# Patient Record
Sex: MALE | Race: WHITE | NOT HISPANIC OR LATINO | URBAN - METROPOLITAN AREA
[De-identification: names, ages, dates, MRNs, and addresses within clinical notes are randomized per-mention and may not be internally consistent; named-entity substitution may affect disease eponyms.]

---

## 2024-03-11 NOTE — ASU PATIENT PROFILE, ADULT - NSICDXPASTMEDICALHX_GEN_ALL_CORE_FT
PAST MEDICAL HISTORY:  No pertinent past medical history PAST MEDICAL HISTORY:  CAROLE (obstructive sleep apnea)

## 2024-03-11 NOTE — ASU PATIENT PROFILE, ADULT - FALL HARM RISK - UNIVERSAL INTERVENTIONS
Bed in lowest position, wheels locked, appropriate side rails in place/Call bell, personal items and telephone in reach/Instruct patient to call for assistance before getting out of bed or chair/Non-slip footwear when patient is out of bed/[place] to call system/Physically safe environment - no spills, clutter or unnecessary equipment/Purposeful Proactive Rounding/Room/bathroom lighting operational, light cord in reach

## 2024-03-11 NOTE — ASU PATIENT PROFILE, ADULT - PAIN SCALE PREFERRED, PROFILE
[facility] Urology Consult Patient/H&P:    Shara Hutchins is a 68 y.o. female who presents for gross hematuria.    Patient with a history of HTN, vulvar cancer s/p chemo/XRT and CKD who presented to the [facility] emergency department on 2/23/20 with abdominal pain, vaginal bleeding, DARCI  (3.8 from 2.5 baseline) and a 20 pound weight loss.     She is s/p loop ileostomy on 2/13/20 for chronic diarrhea.    CT scan on admission to [facility] revealed resolution of her pneumoatosis intestinalis and no evidence of any obvious pathology.     She was admitted to the ICU for IV fluid resuscitation. Patient has since been started on TPN for increased ostomy output and also an Octreotide drip for watery diarrhea.     She has been awaiting LTAC placement, but developed gross hematuria with clots over the past few days in the setting of an indwelling lee for urinary retention while admitted.     Patient reports intermittent gross hematuria for approximately 2 years that has never been worked up.     She denies any fever, chills, flank pain or recent urinary tract infection. She states she was voiding well prior to her present admission.     Of note, she has intermittently been followed by Dr. Aldana with Gyn/Onc for her vulvar cancer.       Urine culture  No growth  2/23/20    Creatinine  1.7   3/14/20      Past Medical History:   Diagnosis Date    Chronic kidney disease, stage III (moderate) 1/7/2018    Diabetes mellitus, type 2     Fatigue     Hypertension     Iron deficiency anemia due to chronic blood loss 1/7/2018    Vulvar cancer 1/7/2019       Past Surgical History:   Procedure Laterality Date    ABDOMINAL SURGERY      BACK SURGERY      carpel tunnel surgery once on each hand      CERVICAL FUSION      x 4    cervix repair      CHOLECYSTECTOMY  2000    DIAGNOSTIC LAPAROSCOPY N/A 2/13/2020    Procedure: LAPAROSCOPY, DIAGNOSTIC;  Surgeon: Robbi Lovell III, MD;  Location: [facility] OR;  Service: General;   Laterality: N/A;    HYSTERECTOMY  1980    ILEOSTOMY N/A 2020    Procedure: CREATION, ILEOSTOMY Loop;  Surgeon: Robbi Lovell III, MD;  Location: [facility];  Service: General;  Laterality: N/A;    LUMBAR LAMINECTOMY      LYSIS OF ADHESIONS N/A 2020    Procedure: LYSIS, ADHESIONS;  Surgeon: Robbi Lovell III, MD;  Location: [facility] OR;  Service: General;  Laterality: N/A;    NECK SURGERY      PHLEBOGRAPHY  2020    Procedure: VENOGRAM;  Surgeon: Robbi Lovell III, MD;  Location: [facility] OR;  Service: General;;    REMOVAL OF VASCULAR ACCESS PORT Right 2020    Procedure: REMOVAL, VASCULAR ACCESS PORT;  Surgeon: Robbi Lovell III, MD;  Location: [facility];  Service: General;  Laterality: Right;    SHOULDER SURGERY      vulva cancer         Family History   Problem Relation Age of Onset    Heart disease Mother     Hypertension Mother     Cancer Mother         lung    Heart disease Father     Heart disease Sister         Open heart surgery    No Known Problems Daughter        Social History     Socioeconomic History    Marital status:      Spouse name: Not on file    Number of children: Not on file    Years of education: Not on file    Highest education level: Not on file   Occupational History    Not on file   Social Needs    Financial resource strain: Not on file    Food insecurity:     Worry: Not on file     Inability: Not on file    Transportation needs:     Medical: Not on file     Non-medical: Not on file   Tobacco Use    Smoking status: Former Smoker     Packs/day: 1.00     Years: 33.00     Pack years: 33.00     Types: Cigarettes     Last attempt to quit: 2000     Years since quittin.6    Smokeless tobacco: Never Used   Substance and Sexual Activity    Alcohol use: No    Drug use: No    Sexual activity: Not on file   Lifestyle    Physical activity:     Days per week: Not on file     Minutes per session: Not on file    Stress: Not on file    Relationships    Social connections:     Talks on phone: Not on file     Gets together: Not on file     Attends Judaism service: Not on file     Active member of club or organization: Not on file     Attends meetings of clubs or organizations: Not on file     Relationship status: Not on file   Other Topics Concern    Not on file   Social History Narrative    Not on file       Review of patient's allergies indicates:   Allergen Reactions    Ciprofloxacin Other (See Comments)     Elevated liver enzymes      Pcn [penicillins] Anaphylaxis       Medications Reviewed: see MAR    ROS:    Constitutional: denies fevers, chills, night sweats, fatigue, malaise  Respiratory: negative for cough, shortness of breath, wheezing, dyspnea.  Cardiovascular: + for high blood pressure, negative for chest pain, varicose veins, ankle swelling, palpitations, syncope.  GI: + diarrhea  Urology: as noted above in HPI  Endocrinology: negative for cold intolerance, excessive thirst, not feeling tired/sluggish, no heat intolerance.   Hematology/Lymph: negative for easy bleeding, easy bruising, swollen glands.  Musculoskeletal: negative for back pain, joint pain, joint swelling, neck pain.  Allergy-Immunology: negative for seasonal allergies, negative for unusual infections.   Skin: negative for boils, breast lumps, hives, itching, rash.   Neurology: + dizziness  Psych: satisfied with life; negative for, anxiety, depression, suicidal thoughts.     PHYSICAL EXAM:    Vitals:    03/14/20 1147   BP: (!) 163/71   Pulse: 92   Resp: 20   Temp: 98.2 °F (36.8 °C)     Body mass index is 21.6 kg/m².       General: Alert, cooperative, no distress, appears stated age  Head: Normocephalic, without obvious abnormality, atraumatic  Neck: no masses, no thyromegaly, no lymphadenopathy  Eyes: PERRL, conjunctiva/corneas clear  Lungs: Respirations unlabored, normal effort, no accessory muscle use  CV: Warm and well perfused extremities  Abdomen: Soft,  non-tender, no CVA tenderness, no hepatosplenomegaly, ileostomy  : Godwin in place with pink urine, draining well  Extremities: Extremities normal, atraumatic, no cyanosis or edema  Psych: Appropriate, well oriented, normal affect, normal mood  Neuro: Non-focal      LABS:    Recent Results (from the past 336 hour(s))   CBC with Automated Differential    Collection Time: 03/01/20  5:22 AM   Result Value Ref Range    WBC 4.63 3.90 - 12.70 K/uL    RBC 2.91 (L) 4.00 - 5.40 M/uL    Hemoglobin 9.1 (L) 12.0 - 16.0 g/dL    Hematocrit 29.0 (L) 37.0 - 48.5 %    Mean Corpuscular Volume 100 (H) 82 - 98 fL    Mean Corpuscular Hemoglobin 31.3 (H) 27.0 - 31.0 pg    Mean Corpuscular Hemoglobin Conc 31.4 (L) 32.0 - 36.0 g/dL    RDW 17.7 (H) 11.5 - 14.5 %    Platelets 116 (L) 150 - 350 K/uL    MPV 11.4 9.2 - 12.9 fL    Immature Granulocytes 0.2 0.0 - 0.5 %    Gran # (ANC) 3.2 1.8 - 7.7 K/uL    Immature Grans (Abs) 0.01 0.00 - 0.04 K/uL    Lymph # 0.7 (L) 1.0 - 4.8 K/uL    Mono # 0.3 0.3 - 1.0 K/uL    Eos # 0.4 0.0 - 0.5 K/uL    Baso # 0.02 0.00 - 0.20 K/uL    nRBC 0 0 /100 WBC    Gran% 69.8 38.0 - 73.0 %    Lymph% 14.9 (L) 18.0 - 48.0 %    Mono% 6.9 4.0 - 15.0 %    Eosinophil% 7.8 0.0 - 8.0 %    Basophil% 0.4 0.0 - 1.9 %    Differential Method Automated    Basic Metabolic Panel (BMP)    Collection Time: 03/01/20  5:22 AM   Result Value Ref Range    Sodium 136 136 - 145 mmol/L    Potassium 3.7 3.5 - 5.1 mmol/L    Chloride 98 95 - 110 mmol/L    CO2 33 (H) 23 - 29 mmol/L    Glucose 100 70 - 110 mg/dL    BUN, Bld 45 (H) 8 - 23 mg/dL    Creatinine 4.0 (H) 0.5 - 1.4 mg/dL    Calcium 8.2 (L) 8.7 - 10.5 mg/dL    Anion Gap 5 (L) 8 - 16 mmol/L    eGFR if African American 12.4 (A) >60 mL/min/1.73 m^2    eGFR if non African American 10.8 (A) >60 mL/min/1.73 m^2   Magnesium    Collection Time: 03/01/20  5:22 AM   Result Value Ref Range    Magnesium 1.7 1.6 - 2.6 mg/dL   Vancomycin, random    Collection Time: 03/01/20  5:22 AM   Result Value Ref  Range    Vancomycin, Random 15.9 Not established ug/mL   CBC with Automated Differential    Collection Time: 03/02/20  5:14 AM   Result Value Ref Range    WBC 3.80 (L) 3.90 - 12.70 K/uL    RBC 2.67 (L) 4.00 - 5.40 M/uL    Hemoglobin 8.4 (L) 12.0 - 16.0 g/dL    Hematocrit 26.9 (L) 37.0 - 48.5 %    Mean Corpuscular Volume 101 (H) 82 - 98 fL    Mean Corpuscular Hemoglobin 31.5 (H) 27.0 - 31.0 pg    Mean Corpuscular Hemoglobin Conc 31.2 (L) 32.0 - 36.0 g/dL    RDW 16.7 (H) 11.5 - 14.5 %    Platelets 124 (L) 150 - 350 K/uL    MPV 12.1 9.2 - 12.9 fL    Immature Granulocytes 0.5 0.0 - 0.5 %    Gran # (ANC) 2.5 1.8 - 7.7 K/uL    Immature Grans (Abs) 0.02 0.00 - 0.04 K/uL    Lymph # 0.7 (L) 1.0 - 4.8 K/uL    Mono # 0.3 0.3 - 1.0 K/uL    Eos # 0.3 0.0 - 0.5 K/uL    Baso # 0.02 0.00 - 0.20 K/uL    nRBC 0 0 /100 WBC    Gran% 64.5 38.0 - 73.0 %    Lymph% 17.4 (L) 18.0 - 48.0 %    Mono% 8.9 4.0 - 15.0 %    Eosinophil% 8.2 (H) 0.0 - 8.0 %    Basophil% 0.5 0.0 - 1.9 %    Differential Method Automated    Basic Metabolic Panel (BMP)    Collection Time: 03/02/20  5:14 AM   Result Value Ref Range    Sodium 139 136 - 145 mmol/L    Potassium 4.5 3.5 - 5.1 mmol/L    Chloride 106 95 - 110 mmol/L    CO2 29 23 - 29 mmol/L    Glucose 97 70 - 110 mg/dL    BUN, Bld 39 (H) 8 - 23 mg/dL    Creatinine 3.1 (H) 0.5 - 1.4 mg/dL    Calcium 8.1 (L) 8.7 - 10.5 mg/dL    Anion Gap 4 (L) 8 - 16 mmol/L    eGFR if African American 16.9 (A) >60 mL/min/1.73 m^2    eGFR if non  14.7 (A) >60 mL/min/1.73 m^2   Magnesium    Collection Time: 03/02/20  5:14 AM   Result Value Ref Range    Magnesium 1.8 1.6 - 2.6 mg/dL   Vancomycin, random    Collection Time: 03/02/20  5:14 AM   Result Value Ref Range    Vancomycin, Random 13.6 Not established ug/mL   Comprehensive metabolic panel    Collection Time: 03/02/20  5:14 AM   Result Value Ref Range    Sodium 139 136 - 145 mmol/L    Potassium 4.5 3.5 - 5.1 mmol/L    Chloride 106 95 - 110 mmol/L    CO2 29 23 -  29 mmol/L    Glucose 97 70 - 110 mg/dL    BUN, Bld 39 (H) 8 - 23 mg/dL    Creatinine 3.1 (H) 0.5 - 1.4 mg/dL    Calcium 8.1 (L) 8.7 - 10.5 mg/dL    Total Protein 4.5 (L) 6.0 - 8.4 g/dL    Albumin 2.5 (L) 3.5 - 5.2 g/dL    Total Bilirubin 1.5 (H) 0.1 - 1.0 mg/dL    Alkaline Phosphatase 303 (H) 55 - 135 U/L    AST 31 10 - 40 U/L    ALT 22 10 - 44 U/L    Anion Gap 4 (L) 8 - 16 mmol/L    eGFR if African American 16.9 (A) >60 mL/min/1.73 m^2    eGFR if non  14.7 (A) >60 mL/min/1.73 m^2   Phosphorus    Collection Time: 03/02/20  5:14 AM   Result Value Ref Range    Phosphorus 2.6 (L) 2.7 - 4.5 mg/dL   Prealbumin    Collection Time: 03/02/20  5:14 AM   Result Value Ref Range    Prealbumin 15 (L) 20.0 - 43.0 mg/dL   Triglycerides    Collection Time: 03/02/20  5:14 AM   Result Value Ref Range    Triglycerides 73 30 - 150 mg/dL   CBC with Automated Differential    Collection Time: 03/03/20  4:40 AM   Result Value Ref Range    WBC 4.30 3.90 - 12.70 K/uL    RBC 2.68 (L) 4.00 - 5.40 M/uL    Hemoglobin 8.5 (L) 12.0 - 16.0 g/dL    Hematocrit 27.6 (L) 37.0 - 48.5 %    Mean Corpuscular Volume 103 (H) 82 - 98 fL    Mean Corpuscular Hemoglobin 31.7 (H) 27.0 - 31.0 pg    Mean Corpuscular Hemoglobin Conc 30.8 (L) 32.0 - 36.0 g/dL    RDW 16.5 (H) 11.5 - 14.5 %    Platelets 122 (L) 150 - 350 K/uL    MPV 11.9 9.2 - 12.9 fL    Immature Granulocytes 0.5 0.0 - 0.5 %    Gran # (ANC) 2.6 1.8 - 7.7 K/uL    Immature Grans (Abs) 0.02 0.00 - 0.04 K/uL    Lymph # 1.0 1.0 - 4.8 K/uL    Mono # 0.4 0.3 - 1.0 K/uL    Eos # 0.3 0.0 - 0.5 K/uL    Baso # 0.01 0.00 - 0.20 K/uL    nRBC 0 0 /100 WBC    Gran% 60.7 38.0 - 73.0 %    Lymph% 22.1 18.0 - 48.0 %    Mono% 9.1 4.0 - 15.0 %    Eosinophil% 7.4 0.0 - 8.0 %    Basophil% 0.2 0.0 - 1.9 %    Differential Method Automated    Basic Metabolic Panel (BMP)    Collection Time: 03/03/20  4:40 AM   Result Value Ref Range    Sodium 141 136 - 145 mmol/L    Potassium 4.4 3.5 - 5.1 mmol/L    Chloride 112  (H) 95 - 110 mmol/L    CO2 26 23 - 29 mmol/L    Glucose 107 70 - 110 mg/dL    BUN, Bld 35 (H) 8 - 23 mg/dL    Creatinine 2.7 (H) 0.5 - 1.4 mg/dL    Calcium 8.3 (L) 8.7 - 10.5 mg/dL    Anion Gap 3 (L) 8 - 16 mmol/L    eGFR if African American 20.0 (A) >60 mL/min/1.73 m^2    eGFR if non  17.3 (A) >60 mL/min/1.73 m^2   Magnesium    Collection Time: 03/03/20  4:40 AM   Result Value Ref Range    Magnesium 2.2 1.6 - 2.6 mg/dL   Comprehensive metabolic panel    Collection Time: 03/03/20  4:40 AM   Result Value Ref Range    Sodium 141 136 - 145 mmol/L    Potassium 4.4 3.5 - 5.1 mmol/L    Chloride 112 (H) 95 - 110 mmol/L    CO2 26 23 - 29 mmol/L    Glucose 107 70 - 110 mg/dL    BUN, Bld 35 (H) 8 - 23 mg/dL    Creatinine 2.7 (H) 0.5 - 1.4 mg/dL    Calcium 8.3 (L) 8.7 - 10.5 mg/dL    Total Protein 4.6 (L) 6.0 - 8.4 g/dL    Albumin 2.6 (L) 3.5 - 5.2 g/dL    Total Bilirubin 1.5 (H) 0.1 - 1.0 mg/dL    Alkaline Phosphatase 241 (H) 55 - 135 U/L    AST 23 10 - 40 U/L    ALT 16 10 - 44 U/L    Anion Gap 3 (L) 8 - 16 mmol/L    eGFR if African American 20.0 (A) >60 mL/min/1.73 m^2    eGFR if non  17.3 (A) >60 mL/min/1.73 m^2   Phosphorus    Collection Time: 03/03/20  4:40 AM   Result Value Ref Range    Phosphorus 2.5 (L) 2.7 - 4.5 mg/dL   CBC with Automated Differential    Collection Time: 03/04/20  4:57 AM   Result Value Ref Range    WBC 4.46 3.90 - 12.70 K/uL    RBC 2.50 (L) 4.00 - 5.40 M/uL    Hemoglobin 7.9 (L) 12.0 - 16.0 g/dL    Hematocrit 25.6 (L) 37.0 - 48.5 %    Mean Corpuscular Volume 102 (H) 82 - 98 fL    Mean Corpuscular Hemoglobin 31.6 (H) 27.0 - 31.0 pg    Mean Corpuscular Hemoglobin Conc 30.9 (L) 32.0 - 36.0 g/dL    RDW 16.3 (H) 11.5 - 14.5 %    Platelets 95 (L) 150 - 350 K/uL    MPV 12.5 9.2 - 12.9 fL    Immature Granulocytes 0.2 0.0 - 0.5 %    Gran # (ANC) 3.0 1.8 - 7.7 K/uL    Immature Grans (Abs) 0.01 0.00 - 0.04 K/uL    Lymph # 0.8 (L) 1.0 - 4.8 K/uL    Mono # 0.4 0.3 - 1.0 K/uL    Eos  # 0.3 0.0 - 0.5 K/uL    Baso # 0.01 0.00 - 0.20 K/uL    nRBC 0 0 /100 WBC    Gran% 66.4 38.0 - 73.0 %    Lymph% 17.7 (L) 18.0 - 48.0 %    Mono% 9.0 4.0 - 15.0 %    Eosinophil% 6.5 0.0 - 8.0 %    Basophil% 0.2 0.0 - 1.9 %    Differential Method Automated    Basic Metabolic Panel (BMP)    Collection Time: 03/04/20  4:57 AM   Result Value Ref Range    Sodium 141 136 - 145 mmol/L    Potassium 3.8 3.5 - 5.1 mmol/L    Chloride 107 95 - 110 mmol/L    CO2 28 23 - 29 mmol/L    Glucose 102 70 - 110 mg/dL    BUN, Bld 32 (H) 8 - 23 mg/dL    Creatinine 2.4 (H) 0.5 - 1.4 mg/dL    Calcium 8.1 (L) 8.7 - 10.5 mg/dL    Anion Gap 6 (L) 8 - 16 mmol/L    eGFR if African American 23.0 (A) >60 mL/min/1.73 m^2    eGFR if non African American 20.0 (A) >60 mL/min/1.73 m^2   Magnesium    Collection Time: 03/04/20  4:57 AM   Result Value Ref Range    Magnesium 2.2 1.6 - 2.6 mg/dL   Comprehensive metabolic panel    Collection Time: 03/04/20  4:57 AM   Result Value Ref Range    Sodium 141 136 - 145 mmol/L    Potassium 3.8 3.5 - 5.1 mmol/L    Chloride 107 95 - 110 mmol/L    CO2 28 23 - 29 mmol/L    Glucose 102 70 - 110 mg/dL    BUN, Bld 32 (H) 8 - 23 mg/dL    Creatinine 2.4 (H) 0.5 - 1.4 mg/dL    Calcium 8.1 (L) 8.7 - 10.5 mg/dL    Total Protein 4.4 (L) 6.0 - 8.4 g/dL    Albumin 2.4 (L) 3.5 - 5.2 g/dL    Total Bilirubin 1.2 (H) 0.1 - 1.0 mg/dL    Alkaline Phosphatase 228 (H) 55 - 135 U/L    AST 21 10 - 40 U/L    ALT 15 10 - 44 U/L    Anion Gap 6 (L) 8 - 16 mmol/L    eGFR if African American 23.0 (A) >60 mL/min/1.73 m^2    eGFR if non African American 20.0 (A) >60 mL/min/1.73 m^2   Phosphorus    Collection Time: 03/04/20  4:57 AM   Result Value Ref Range    Phosphorus 2.3 (L) 2.7 - 4.5 mg/dL   Vancomycin, random    Collection Time: 03/04/20  4:57 AM   Result Value Ref Range    Vancomycin, Random 14.7 Not established ug/mL   Iron and TIBC    Collection Time: 03/04/20  4:57 AM   Result Value Ref Range    Iron 34 30 - 160 ug/dL    Transferrin 107 (L)  200 - 375 mg/dL    TIBC 150 (L) 250 - 450 ug/dL    Saturated Iron 23 20 - 50 %   Ferritin    Collection Time: 03/04/20  4:57 AM   Result Value Ref Range    Ferritin 407 (H) 20.0 - 300.0 ng/mL   CBC with Automated Differential    Collection Time: 03/05/20  4:54 AM   Result Value Ref Range    WBC 3.60 (L) 3.90 - 12.70 K/uL    RBC 2.40 (L) 4.00 - 5.40 M/uL    Hemoglobin 7.5 (L) 12.0 - 16.0 g/dL    Hematocrit 24.5 (L) 37.0 - 48.5 %    Mean Corpuscular Volume 102 (H) 82 - 98 fL    Mean Corpuscular Hemoglobin 31.3 (H) 27.0 - 31.0 pg    Mean Corpuscular Hemoglobin Conc 30.6 (L) 32.0 - 36.0 g/dL    RDW 16.0 (H) 11.5 - 14.5 %    Platelets 87 (L) 150 - 350 K/uL    MPV 12.5 9.2 - 12.9 fL    Immature Granulocytes 0.6 (H) 0.0 - 0.5 %    Gran # (ANC) 2.2 1.8 - 7.7 K/uL    Immature Grans (Abs) 0.02 0.00 - 0.04 K/uL    Lymph # 0.8 (L) 1.0 - 4.8 K/uL    Mono # 0.4 0.3 - 1.0 K/uL    Eos # 0.2 0.0 - 0.5 K/uL    Baso # 0.01 0.00 - 0.20 K/uL    nRBC 0 0 /100 WBC    Gran% 59.9 38.0 - 73.0 %    Lymph% 20.8 18.0 - 48.0 %    Mono% 11.7 4.0 - 15.0 %    Eosinophil% 6.7 0.0 - 8.0 %    Basophil% 0.3 0.0 - 1.9 %    Differential Method Automated    Basic Metabolic Panel (BMP)    Collection Time: 03/05/20  4:54 AM   Result Value Ref Range    Sodium 141 136 - 145 mmol/L    Potassium 3.9 3.5 - 5.1 mmol/L    Chloride 108 95 - 110 mmol/L    CO2 31 (H) 23 - 29 mmol/L    Glucose 106 70 - 110 mg/dL    BUN, Bld 30 (H) 8 - 23 mg/dL    Creatinine 2.3 (H) 0.5 - 1.4 mg/dL    Calcium 7.9 (L) 8.7 - 10.5 mg/dL    Anion Gap 2 (L) 8 - 16 mmol/L    eGFR if African American 24.3 (A) >60 mL/min/1.73 m^2    eGFR if non  21.1 (A) >60 mL/min/1.73 m^2   Magnesium    Collection Time: 03/05/20  4:54 AM   Result Value Ref Range    Magnesium 2.2 1.6 - 2.6 mg/dL   Comprehensive metabolic panel    Collection Time: 03/05/20  4:54 AM   Result Value Ref Range    Sodium 141 136 - 145 mmol/L    Potassium 3.9 3.5 - 5.1 mmol/L    Chloride 108 95 - 110 mmol/L    CO2 31  (H) 23 - 29 mmol/L    Glucose 106 70 - 110 mg/dL    BUN, Bld 30 (H) 8 - 23 mg/dL    Creatinine 2.3 (H) 0.5 - 1.4 mg/dL    Calcium 7.9 (L) 8.7 - 10.5 mg/dL    Total Protein 4.3 (L) 6.0 - 8.4 g/dL    Albumin 2.4 (L) 3.5 - 5.2 g/dL    Total Bilirubin 1.3 (H) 0.1 - 1.0 mg/dL    Alkaline Phosphatase 217 (H) 55 - 135 U/L    AST 22 10 - 40 U/L    ALT 15 10 - 44 U/L    Anion Gap 2 (L) 8 - 16 mmol/L    eGFR if African American 24.3 (A) >60 mL/min/1.73 m^2    eGFR if non  21.1 (A) >60 mL/min/1.73 m^2   Phosphorus    Collection Time: 03/05/20  4:54 AM   Result Value Ref Range    Phosphorus 2.2 (L) 2.7 - 4.5 mg/dL   CBC with Automated Differential    Collection Time: 03/06/20  4:32 AM   Result Value Ref Range    WBC 3.61 (L) 3.90 - 12.70 K/uL    RBC 2.40 (L) 4.00 - 5.40 M/uL    Hemoglobin 7.4 (L) 12.0 - 16.0 g/dL    Hematocrit 24.7 (L) 37.0 - 48.5 %    Mean Corpuscular Volume 103 (H) 82 - 98 fL    Mean Corpuscular Hemoglobin 30.8 27.0 - 31.0 pg    Mean Corpuscular Hemoglobin Conc 30.0 (L) 32.0 - 36.0 g/dL    RDW 16.0 (H) 11.5 - 14.5 %    Platelets 85 (L) 150 - 350 K/uL    MPV 12.8 9.2 - 12.9 fL    Immature Granulocytes 0.6 (H) 0.0 - 0.5 %    Gran # (ANC) 2.1 1.8 - 7.7 K/uL    Immature Grans (Abs) 0.02 0.00 - 0.04 K/uL    Lymph # 0.9 (L) 1.0 - 4.8 K/uL    Mono # 0.4 0.3 - 1.0 K/uL    Eos # 0.2 0.0 - 0.5 K/uL    Baso # 0.02 0.00 - 0.20 K/uL    nRBC 0 0 /100 WBC    Gran% 57.3 38.0 - 73.0 %    Lymph% 23.5 18.0 - 48.0 %    Mono% 12.2 4.0 - 15.0 %    Eosinophil% 5.8 0.0 - 8.0 %    Basophil% 0.6 0.0 - 1.9 %    Differential Method Automated    Magnesium    Collection Time: 03/06/20  4:32 AM   Result Value Ref Range    Magnesium 2.3 1.6 - 2.6 mg/dL   Comprehensive metabolic panel    Collection Time: 03/06/20  4:32 AM   Result Value Ref Range    Sodium 143 136 - 145 mmol/L    Potassium 3.5 3.5 - 5.1 mmol/L    Chloride 107 95 - 110 mmol/L    CO2 30 (H) 23 - 29 mmol/L    Glucose 125 (H) 70 - 110 mg/dL    BUN, Bld 28 (H) 8 -  23 mg/dL    Creatinine 2.1 (H) 0.5 - 1.4 mg/dL    Calcium 7.7 (L) 8.7 - 10.5 mg/dL    Total Protein 4.2 (L) 6.0 - 8.4 g/dL    Albumin 2.1 (L) 3.5 - 5.2 g/dL    Total Bilirubin 0.7 0.1 - 1.0 mg/dL    Alkaline Phosphatase 223 (H) 55 - 135 U/L    AST 21 10 - 40 U/L    ALT 14 10 - 44 U/L    Anion Gap 6 (L) 8 - 16 mmol/L    eGFR if African American 27 (A) >60 mL/min/1.73 m^2    eGFR if non African American 23 (A) >60 mL/min/1.73 m^2   Phosphorus    Collection Time: 03/06/20  4:32 AM   Result Value Ref Range    Phosphorus 2.4 (L) 2.7 - 4.5 mg/dL   CBC with Automated Differential    Collection Time: 03/07/20  4:37 AM   Result Value Ref Range    WBC 4.25 3.90 - 12.70 K/uL    RBC 2.49 (L) 4.00 - 5.40 M/uL    Hemoglobin 7.8 (L) 12.0 - 16.0 g/dL    Hematocrit 25.4 (L) 37.0 - 48.5 %    Mean Corpuscular Volume 102 (H) 82 - 98 fL    Mean Corpuscular Hemoglobin 31.3 (H) 27.0 - 31.0 pg    Mean Corpuscular Hemoglobin Conc 30.7 (L) 32.0 - 36.0 g/dL    RDW 16.1 (H) 11.5 - 14.5 %    Platelets 100 (L) 150 - 350 K/uL    MPV 12.7 9.2 - 12.9 fL    Immature Granulocytes 0.5 0.0 - 0.5 %    Gran # (ANC) 2.1 1.8 - 7.7 K/uL    Immature Grans (Abs) 0.02 0.00 - 0.04 K/uL    Lymph # 1.4 1.0 - 4.8 K/uL    Mono # 0.4 0.3 - 1.0 K/uL    Eos # 0.3 0.0 - 0.5 K/uL    Baso # 0.02 0.00 - 0.20 K/uL    nRBC 0 0 /100 WBC    Gran% 49.4 38.0 - 73.0 %    Lymph% 32.9 18.0 - 48.0 %    Mono% 10.1 4.0 - 15.0 %    Eosinophil% 6.6 0.0 - 8.0 %    Basophil% 0.5 0.0 - 1.9 %    Differential Method Automated    Magnesium    Collection Time: 03/07/20  4:37 AM   Result Value Ref Range    Magnesium 2.1 1.6 - 2.6 mg/dL   Comprehensive metabolic panel    Collection Time: 03/07/20  4:37 AM   Result Value Ref Range    Sodium 144 136 - 145 mmol/L    Potassium 4.0 3.5 - 5.1 mmol/L    Chloride 109 95 - 110 mmol/L    CO2 27 23 - 29 mmol/L    Glucose 68 (L) 70 - 110 mg/dL    BUN, Bld 27 (H) 8 - 23 mg/dL    Creatinine 1.8 (H) 0.5 - 1.4 mg/dL    Calcium 7.9 (L) 8.7 - 10.5 mg/dL    Total  Protein 4.3 (L) 6.0 - 8.4 g/dL    Albumin 2.2 (L) 3.5 - 5.2 g/dL    Total Bilirubin 0.8 0.1 - 1.0 mg/dL    Alkaline Phosphatase 220 (H) 55 - 135 U/L    AST 20 10 - 40 U/L    ALT 16 10 - 44 U/L    Anion Gap 8 8 - 16 mmol/L    eGFR if African American 33 (A) >60 mL/min/1.73 m^2    eGFR if non African American 28 (A) >60 mL/min/1.73 m^2   Phosphorus    Collection Time: 03/07/20  4:37 AM   Result Value Ref Range    Phosphorus 2.6 (L) 2.7 - 4.5 mg/dL   CBC with Automated Differential    Collection Time: 03/08/20  6:21 AM   Result Value Ref Range    WBC 3.73 (L) 3.90 - 12.70 K/uL    RBC 2.31 (L) 4.00 - 5.40 M/uL    Hemoglobin 7.2 (L) 12.0 - 16.0 g/dL    Hematocrit 23.5 (L) 37.0 - 48.5 %    Mean Corpuscular Volume 102 (H) 82 - 98 fL    Mean Corpuscular Hemoglobin 31.2 (H) 27.0 - 31.0 pg    Mean Corpuscular Hemoglobin Conc 30.6 (L) 32.0 - 36.0 g/dL    RDW 16.0 (H) 11.5 - 14.5 %    Platelets 99 (L) 150 - 350 K/uL    MPV 12.5 9.2 - 12.9 fL    Immature Granulocytes 0.3 0.0 - 0.5 %    Gran # (ANC) 2.1 1.8 - 7.7 K/uL    Immature Grans (Abs) 0.01 0.00 - 0.04 K/uL    Lymph # 1.0 1.0 - 4.8 K/uL    Mono # 0.4 0.3 - 1.0 K/uL    Eos # 0.3 0.0 - 0.5 K/uL    Baso # 0.01 0.00 - 0.20 K/uL    nRBC 0 0 /100 WBC    Gran% 55.2 38.0 - 73.0 %    Lymph% 25.7 18.0 - 48.0 %    Mono% 11.0 4.0 - 15.0 %    Eosinophil% 7.5 0.0 - 8.0 %    Basophil% 0.3 0.0 - 1.9 %    Differential Method Automated    Magnesium    Collection Time: 03/08/20  6:21 AM   Result Value Ref Range    Magnesium 2.0 1.6 - 2.6 mg/dL   Comprehensive metabolic panel    Collection Time: 03/08/20  6:21 AM   Result Value Ref Range    Sodium 140 136 - 145 mmol/L    Potassium 3.3 (L) 3.5 - 5.1 mmol/L    Chloride 107 95 - 110 mmol/L    CO2 29 23 - 29 mmol/L    Glucose 153 (H) 70 - 110 mg/dL    BUN, Bld 25 (H) 8 - 23 mg/dL    Creatinine 1.9 (H) 0.5 - 1.4 mg/dL    Calcium 7.8 (L) 8.7 - 10.5 mg/dL    Total Protein 4.3 (L) 6.0 - 8.4 g/dL    Albumin 2.2 (L) 3.5 - 5.2 g/dL    Total Bilirubin 1.1  (H) 0.1 - 1.0 mg/dL    Alkaline Phosphatase 189 (H) 55 - 135 U/L    AST 20 10 - 40 U/L    ALT 14 10 - 44 U/L    Anion Gap 4 (L) 8 - 16 mmol/L    eGFR if African American 30.6 (A) >60 mL/min/1.73 m^2    eGFR if non  26.5 (A) >60 mL/min/1.73 m^2   Phosphorus    Collection Time: 03/08/20  6:21 AM   Result Value Ref Range    Phosphorus 3.0 2.7 - 4.5 mg/dL   Prepare RBC 1 Unit    Collection Time: 03/08/20 11:43 AM   Result Value Ref Range    UNIT NUMBER [id number]     Product Code [id number]     DISPENSE STATUS TRANSFUSED     CODING SYSTEM [id number]     Unit Blood Type Code 6200     Unit Blood Type A POS     Unit Expiration [id number]     UNIT NUMBER [id number]     Product Code [id number]     DISPENSE STATUS RETURNED     CODING SYSTEM [id number]     Unit Blood Type Code 6200     Unit Blood Type A POS     Unit Expiration [id number]    Type & Screen    Collection Time: 03/08/20 11:49 AM   Result Value Ref Range    Group & Rh A POS     Indirect Bruno NEG    POCT glucose    Collection Time: 03/08/20  7:02 PM   Result Value Ref Range    POC Glucose 122 (H) 70 - 110   CBC with Automated Differential    Collection Time: 03/09/20  2:43 AM   Result Value Ref Range    WBC 3.84 (L) 3.90 - 12.70 K/uL    RBC 2.66 (L) 4.00 - 5.40 M/uL    Hemoglobin 8.1 (L) 12.0 - 16.0 g/dL    Hematocrit 25.9 (L) 37.0 - 48.5 %    Mean Corpuscular Volume 97 82 - 98 fL    Mean Corpuscular Hemoglobin 30.5 27.0 - 31.0 pg    Mean Corpuscular Hemoglobin Conc 31.3 (L) 32.0 - 36.0 g/dL    RDW 18.8 (H) 11.5 - 14.5 %    Platelets 101 (L) 150 - 350 K/uL    MPV 12.3 9.2 - 12.9 fL    Immature Granulocytes 0.8 (H) 0.0 - 0.5 %    Gran # (ANC) 2.3 1.8 - 7.7 K/uL    Immature Grans (Abs) 0.03 0.00 - 0.04 K/uL    Lymph # 0.9 (L) 1.0 - 4.8 K/uL    Mono # 0.4 0.3 - 1.0 K/uL    Eos # 0.2 0.0 - 0.5 K/uL    Baso # 0.01 0.00 - 0.20 K/uL    nRBC 0 0 /100 WBC    Gran% 58.7 38.0 - 73.0 %    Lymph% 23.7 18.0 - 48.0 %    Mono% 10.2 4.0 - 15.0 %    Eosinophil% 6.3  0.0 - 8.0 %    Basophil% 0.3 0.0 - 1.9 %    Differential Method Automated    Magnesium    Collection Time: 03/09/20  2:43 AM   Result Value Ref Range    Magnesium 2.1 1.6 - 2.6 mg/dL   Comprehensive metabolic panel    Collection Time: 03/09/20  2:43 AM   Result Value Ref Range    Sodium 140 136 - 145 mmol/L    Potassium 3.3 (L) 3.5 - 5.1 mmol/L    Chloride 109 95 - 110 mmol/L    CO2 27 23 - 29 mmol/L    Glucose 127 (H) 70 - 110 mg/dL    BUN, Bld 27 (H) 8 - 23 mg/dL    Creatinine 1.7 (H) 0.5 - 1.4 mg/dL    Calcium 7.5 (L) 8.7 - 10.5 mg/dL    Total Protein 4.1 (L) 6.0 - 8.4 g/dL    Albumin 2.1 (L) 3.5 - 5.2 g/dL    Total Bilirubin 1.4 (H) 0.1 - 1.0 mg/dL    Alkaline Phosphatase 173 (H) 55 - 135 U/L    AST 18 10 - 40 U/L    ALT 13 10 - 44 U/L    Anion Gap 4 (L) 8 - 16 mmol/L    eGFR if African American 35.0 (A) >60 mL/min/1.73 m^2    eGFR if non  30.3 (A) >60 mL/min/1.73 m^2   Phosphorus    Collection Time: 03/09/20  2:43 AM   Result Value Ref Range    Phosphorus 2.6 (L) 2.7 - 4.5 mg/dL   Prealbumin    Collection Time: 03/09/20  2:43 AM   Result Value Ref Range    Prealbumin 12 (L) 20.0 - 43.0 mg/dL   Triglycerides    Collection Time: 03/09/20  2:43 AM   Result Value Ref Range    Triglycerides 80 30 - 150 mg/dL   POCT glucose    Collection Time: 03/09/20  7:54 AM   Result Value Ref Range    POC Glucose 106 70 - 110   POCT glucose    Collection Time: 03/09/20 11:52 AM   Result Value Ref Range    POC Glucose 119 (H) 70 - 110   Potassium    Collection Time: 03/09/20 12:57 PM   Result Value Ref Range    Potassium 3.5 3.5 - 5.1 mmol/L   POCT glucose    Collection Time: 03/09/20  4:08 PM   Result Value Ref Range    POC Glucose 119 (H) 70 - 110   POCT glucose    Collection Time: 03/09/20  8:11 PM   Result Value Ref Range    POC Glucose 113 (H) 70 - 110   CBC with Automated Differential    Collection Time: 03/10/20  5:30 AM   Result Value Ref Range    WBC 5.64 3.90 - 12.70 K/uL    RBC 2.91 (L) 4.00 - 5.40 M/uL     Hemoglobin 8.9 (L) 12.0 - 16.0 g/dL    Hematocrit 28.6 (L) 37.0 - 48.5 %    Mean Corpuscular Volume 98 82 - 98 fL    Mean Corpuscular Hemoglobin 30.6 27.0 - 31.0 pg    Mean Corpuscular Hemoglobin Conc 31.1 (L) 32.0 - 36.0 g/dL    RDW 18.1 (H) 11.5 - 14.5 %    Platelets 115 (L) 150 - 350 K/uL    MPV 12.4 9.2 - 12.9 fL    Immature Granulocytes 0.9 (H) 0.0 - 0.5 %    Gran # (ANC) 3.7 1.8 - 7.7 K/uL    Immature Grans (Abs) 0.05 (H) 0.00 - 0.04 K/uL    Lymph # 1.1 1.0 - 4.8 K/uL    Mono # 0.5 0.3 - 1.0 K/uL    Eos # 0.3 0.0 - 0.5 K/uL    Baso # 0.02 0.00 - 0.20 K/uL    nRBC 0 0 /100 WBC    Gran% 65.5 38.0 - 73.0 %    Lymph% 18.8 18.0 - 48.0 %    Mono% 9.4 4.0 - 15.0 %    Eosinophil% 5.0 0.0 - 8.0 %    Basophil% 0.4 0.0 - 1.9 %    Differential Method Automated    Magnesium    Collection Time: 03/10/20  5:30 AM   Result Value Ref Range    Magnesium 2.1 1.6 - 2.6 mg/dL   Comprehensive metabolic panel    Collection Time: 03/10/20  5:30 AM   Result Value Ref Range    Sodium 141 136 - 145 mmol/L    Potassium 3.5 3.5 - 5.1 mmol/L    Chloride 110 95 - 110 mmol/L    CO2 28 23 - 29 mmol/L    Glucose 102 70 - 110 mg/dL    BUN, Bld 31 (H) 8 - 23 mg/dL    Creatinine 1.6 (H) 0.5 - 1.4 mg/dL    Calcium 7.6 (L) 8.7 - 10.5 mg/dL    Total Protein 4.9 (L) 6.0 - 8.4 g/dL    Albumin 2.5 (L) 3.5 - 5.2 g/dL    Total Bilirubin 1.0 0.1 - 1.0 mg/dL    Alkaline Phosphatase 201 (H) 55 - 135 U/L    AST 20 10 - 40 U/L    ALT 13 10 - 44 U/L    Anion Gap 3 (L) 8 - 16 mmol/L    eGFR if African American 37.6 (A) >60 mL/min/1.73 m^2    eGFR if non  32.6 (A) >60 mL/min/1.73 m^2   Phosphorus    Collection Time: 03/10/20  5:30 AM   Result Value Ref Range    Phosphorus 2.2 (L) 2.7 - 4.5 mg/dL   POCT glucose    Collection Time: 03/10/20  7:24 AM   Result Value Ref Range    POC Glucose 114 (H) 70 - 110   POCT glucose    Collection Time: 03/10/20 11:32 AM   Result Value Ref Range    POC Glucose 176 (H) 70 - 110   POCT glucose    Collection  Time: 03/10/20  4:05 PM   Result Value Ref Range    POC Glucose 116 (H) 70 - 110   POCT glucose    Collection Time: 03/10/20  8:02 PM   Result Value Ref Range    POC Glucose 167 (H) 70 - 110   CBC with Automated Differential    Collection Time: 03/11/20  3:49 AM   Result Value Ref Range    WBC 6.29 3.90 - 12.70 K/uL    RBC 2.69 (L) 4.00 - 5.40 M/uL    Hemoglobin 8.3 (L) 12.0 - 16.0 g/dL    Hematocrit 26.5 (L) 37.0 - 48.5 %    Mean Corpuscular Volume 99 (H) 82 - 98 fL    Mean Corpuscular Hemoglobin 30.9 27.0 - 31.0 pg    Mean Corpuscular Hemoglobin Conc 31.3 (L) 32.0 - 36.0 g/dL    RDW 17.4 (H) 11.5 - 14.5 %    Platelets 102 (L) 150 - 350 K/uL    MPV 12.4 9.2 - 12.9 fL    Immature Granulocytes 1.1 (H) 0.0 - 0.5 %    Gran # (ANC) 4.1 1.8 - 7.7 K/uL    Immature Grans (Abs) 0.07 (H) 0.00 - 0.04 K/uL    Lymph # 1.2 1.0 - 4.8 K/uL    Mono # 0.6 0.3 - 1.0 K/uL    Eos # 0.3 0.0 - 0.5 K/uL    Baso # 0.02 0.00 - 0.20 K/uL    nRBC 0 0 /100 WBC    Gran% 65.5 38.0 - 73.0 %    Lymph% 19.1 18.0 - 48.0 %    Mono% 9.5 4.0 - 15.0 %    Eosinophil% 4.5 0.0 - 8.0 %    Basophil% 0.3 0.0 - 1.9 %    Differential Method Automated    Magnesium    Collection Time: 03/11/20  3:49 AM   Result Value Ref Range    Magnesium 2.0 1.6 - 2.6 mg/dL   Comprehensive metabolic panel    Collection Time: 03/11/20  3:49 AM   Result Value Ref Range    Sodium 141 136 - 145 mmol/L    Potassium 3.3 (L) 3.5 - 5.1 mmol/L    Chloride 105 95 - 110 mmol/L    CO2 27 23 - 29 mmol/L    Glucose 110 70 - 110 mg/dL    BUN, Bld 31 (H) 8 - 23 mg/dL    Creatinine 1.6 (H) 0.5 - 1.4 mg/dL    Calcium 7.9 (L) 8.7 - 10.5 mg/dL    Total Protein 4.8 (L) 6.0 - 8.4 g/dL    Albumin 2.4 (L) 3.5 - 5.2 g/dL    Total Bilirubin 1.0 0.1 - 1.0 mg/dL    Alkaline Phosphatase 196 (H) 55 - 135 U/L    AST 23 10 - 40 U/L    ALT 13 10 - 44 U/L    Anion Gap 9 8 - 16 mmol/L    eGFR if African American 37.6 (A) >60 mL/min/1.73 m^2    eGFR if non  32.6 (A) >60 mL/min/1.73 m^2    Phosphorus    Collection Time: 03/11/20  3:49 AM   Result Value Ref Range    Phosphorus 2.5 (L) 2.7 - 4.5 mg/dL   POCT glucose    Collection Time: 03/11/20  7:41 AM   Result Value Ref Range    POC Glucose 126 (H) 70 - 110   POCT glucose    Collection Time: 03/11/20 12:10 PM   Result Value Ref Range    POC Glucose 135 (H) 70 - 110   POCT glucose    Collection Time: 03/11/20  4:41 PM   Result Value Ref Range    POC Glucose 119 (H) 70 - 110   POCT glucose    Collection Time: 03/11/20  8:22 PM   Result Value Ref Range    POC Glucose 123 (H) 70 - 110   CBC with Automated Differential    Collection Time: 03/12/20  4:41 AM   Result Value Ref Range    WBC 6.76 3.90 - 12.70 K/uL    RBC 2.66 (L) 4.00 - 5.40 M/uL    Hemoglobin 8.3 (L) 12.0 - 16.0 g/dL    Hematocrit 26.7 (L) 37.0 - 48.5 %    Mean Corpuscular Volume 100 (H) 82 - 98 fL    Mean Corpuscular Hemoglobin 31.2 (H) 27.0 - 31.0 pg    Mean Corpuscular Hemoglobin Conc 31.1 (L) 32.0 - 36.0 g/dL    RDW 17.4 (H) 11.5 - 14.5 %    Platelets 111 (L) 150 - 350 K/uL    MPV 12.5 9.2 - 12.9 fL    Immature Granulocytes 1.2 (H) 0.0 - 0.5 %    Gran # (ANC) 4.6 1.8 - 7.7 K/uL    Immature Grans (Abs) 0.08 (H) 0.00 - 0.04 K/uL    Lymph # 1.2 1.0 - 4.8 K/uL    Mono # 0.5 0.3 - 1.0 K/uL    Eos # 0.4 0.0 - 0.5 K/uL    Baso # 0.02 0.00 - 0.20 K/uL    nRBC 0 0 /100 WBC    Gran% 67.9 38.0 - 73.0 %    Lymph% 17.5 (L) 18.0 - 48.0 %    Mono% 7.0 4.0 - 15.0 %    Eosinophil% 6.1 0.0 - 8.0 %    Basophil% 0.3 0.0 - 1.9 %    Differential Method Automated    Magnesium    Collection Time: 03/12/20  4:41 AM   Result Value Ref Range    Magnesium 2.5 1.6 - 2.6 mg/dL   Comprehensive metabolic panel    Collection Time: 03/12/20  4:41 AM   Result Value Ref Range    Sodium 139 136 - 145 mmol/L    Potassium 4.8 3.5 - 5.1 mmol/L    Chloride 107 95 - 110 mmol/L    CO2 25 23 - 29 mmol/L    Glucose 343 (H) 70 - 110 mg/dL    BUN, Bld 31 (H) 8 - 23 mg/dL    Creatinine 1.6 (H) 0.5 - 1.4 mg/dL    Calcium 7.7 (L) 8.7 -  10.5 mg/dL    Total Protein 4.6 (L) 6.0 - 8.4 g/dL    Albumin 2.3 (L) 3.5 - 5.2 g/dL    Total Bilirubin 1.1 (H) 0.1 - 1.0 mg/dL    Alkaline Phosphatase 181 (H) 55 - 135 U/L    AST 21 10 - 40 U/L    ALT 12 10 - 44 U/L    Anion Gap 7 (L) 8 - 16 mmol/L    eGFR if African American 37.6 (A) >60 mL/min/1.73 m^2    eGFR if non  32.6 (A) >60 mL/min/1.73 m^2   Phosphorus    Collection Time: 03/12/20  4:41 AM   Result Value Ref Range    Phosphorus 4.4 2.7 - 4.5 mg/dL   POCT glucose    Collection Time: 03/12/20 12:04 PM   Result Value Ref Range    POC Glucose 107 70 - 110   POCT glucose    Collection Time: 03/12/20  4:08 PM   Result Value Ref Range    POC Glucose 129 (H) 70 - 110   Magnesium    Collection Time: 03/13/20  5:49 AM   Result Value Ref Range    Magnesium 2.2 1.6 - 2.6 mg/dL   Comprehensive metabolic panel    Collection Time: 03/13/20  5:49 AM   Result Value Ref Range    Sodium 142 136 - 145 mmol/L    Potassium 4.4 3.5 - 5.1 mmol/L    Chloride 108 95 - 110 mmol/L    CO2 27 23 - 29 mmol/L    Glucose 110 70 - 110 mg/dL    BUN, Bld 34 (H) 8 - 23 mg/dL    Creatinine 1.7 (H) 0.5 - 1.4 mg/dL    Calcium 7.9 (L) 8.7 - 10.5 mg/dL    Total Protein 4.8 (L) 6.0 - 8.4 g/dL    Albumin 2.3 (L) 3.5 - 5.2 g/dL    Total Bilirubin 1.1 (H) 0.1 - 1.0 mg/dL    Alkaline Phosphatase 197 (H) 55 - 135 U/L    AST 19 10 - 40 U/L    ALT 13 10 - 44 U/L    Anion Gap 7 (L) 8 - 16 mmol/L    eGFR if African American 35.0 (A) >60 mL/min/1.73 m^2    eGFR if non  30.3 (A) >60 mL/min/1.73 m^2   Phosphorus    Collection Time: 03/13/20  5:49 AM   Result Value Ref Range    Phosphorus 3.7 2.7 - 4.5 mg/dL   POCT glucose    Collection Time: 03/13/20  4:24 PM   Result Value Ref Range    POC Glucose 130 (H) 70 - 110   POCT glucose    Collection Time: 03/13/20  7:29 PM   Result Value Ref Range    POC Glucose 157 (H) 70 - 110   Procalcitonin    Collection Time: 03/13/20  8:02 PM   Result Value Ref Range    Procalcitonin 0.31 0.00 -  0.50 ng/mL   Magnesium    Collection Time: 03/14/20  5:04 AM   Result Value Ref Range    Magnesium 2.2 1.6 - 2.6 mg/dL   Comprehensive metabolic panel    Collection Time: 03/14/20  5:04 AM   Result Value Ref Range    Sodium 139 136 - 145 mmol/L    Potassium 3.9 3.5 - 5.1 mmol/L    Chloride 109 95 - 110 mmol/L    CO2 25 23 - 29 mmol/L    Glucose 110 70 - 110 mg/dL    BUN, Bld 37 (H) 8 - 23 mg/dL    Creatinine 1.7 (H) 0.5 - 1.4 mg/dL    Calcium 8.0 (L) 8.7 - 10.5 mg/dL    Total Protein 4.7 (L) 6.0 - 8.4 g/dL    Albumin 2.1 (L) 3.5 - 5.2 g/dL    Total Bilirubin 1.1 (H) 0.1 - 1.0 mg/dL    Alkaline Phosphatase 197 (H) 55 - 135 U/L    AST 19 10 - 40 U/L    ALT 12 10 - 44 U/L    Anion Gap 5 (L) 8 - 16 mmol/L    eGFR if African American 35.0 (A) >60 mL/min/1.73 m^2    eGFR if non  30.3 (A) >60 mL/min/1.73 m^2   Phosphorus    Collection Time: 03/14/20  5:04 AM   Result Value Ref Range    Phosphorus 4.4 2.7 - 4.5 mg/dL   CBC Without Differential    Collection Time: 03/14/20  5:04 AM   Result Value Ref Range    WBC 8.04 3.90 - 12.70 K/uL    RBC 2.54 (L) 4.00 - 5.40 M/uL    Hemoglobin 7.8 (L) 12.0 - 16.0 g/dL    Hematocrit 25.4 (L) 37.0 - 48.5 %    Mean Corpuscular Volume 100 (H) 82 - 98 fL    Mean Corpuscular Hemoglobin 30.7 27.0 - 31.0 pg    Mean Corpuscular Hemoglobin Conc 30.7 (L) 32.0 - 36.0 g/dL    RDW 16.8 (H) 11.5 - 14.5 %    Platelets 139 (L) 150 - 350 K/uL    MPV 11.9 9.2 - 12.9 fL   TSH    Collection Time: 03/14/20  5:04 AM   Result Value Ref Range    TSH 3.770 0.340 - 5.600 uIU/mL   T4, free    Collection Time: 03/14/20  5:04 AM   Result Value Ref Range    Free T4 0.84 0.71 - 1.51 ng/dL   POCT glucose    Collection Time: 03/14/20  7:22 AM   Result Value Ref Range    POC Glucose 120 (H) 70 - 110   POCT glucose    Collection Time: 03/14/20 11:31 AM   Result Value Ref Range    POC Glucose 118 (H) 70 - 110       IMAGING:      CT abd/pelvis wo contrast 2/23/20 Images independently reviewed by me    There  is no pleural effusion or pulmonary infiltrate within the  included lower chest.    The liver is diffusely fatty and not enlarged. There are  cholecystectomy clips. There is pancreatic atrophy. The spleen and  adrenal glands are normal. There are numerous renal stones within the  calyces and infundibula. There is no hydronephrosis, ureteral stone or  bladder stone.    There has been interval surgery since the recent CT. There are midline  abdominal wall staples, and a right ileostomy. Previous pneumatosis  intestinalis along the right colon is no longer present. Bowel is not  dilated or thickened.    Urinary bladder is mildly filled. Uterus is surgically absent.  Generalized thinning of the subcutaneous fat of the abdominal wall has  decreased compared with the prior exam. Aorta is calcified and normal  in caliber.    There is no destructive osseous lesion or acute fracture.      Assessment/Diagnosis:    1. Gross hematuria  2. Urinary retention    Plans:    - We discussed the etiology and management of the patient's gross hematuria. Explained that hematuria is multi-factorial and can be secondary to  cancer, obstructive uropathy, nephritis,  trauma,  infections, thrombosis, nephrolithiasis, bleeding disorders and medications. No obvious pathology to explain hematuria on CT scan. We discussed hematuria work-up and the benefit of further evaluation with cystourethroscopy as an outpatient to rule out any malignancy, stones or other pathology. All risks, benefits and alternatives discussed at length and all questions answered.    - Recommend discharging with lee catheter and outpatient urology follow up for voiding trial and cystoscopy.   - Irrigate lee catheter q6 hours and PRN with 120 cc NS or water. (order placed)  - Follow up with Dr. Katya MASON for re-evaluation of vulva in the setting of history of vulvar cancer.        none

## 2024-03-11 NOTE — ASU PATIENT PROFILE, ADULT - NSICDXPASTSURGICALHX_GEN_ALL_CORE_FT
PAST SURGICAL HISTORY:  H/O arthroscopy of hip Right    H/O hernia repair left inguinal    S/P arthroscopy of knee Right

## 2024-03-12 ENCOUNTER — OUTPATIENT (OUTPATIENT)
Dept: OUTPATIENT SERVICES | Facility: HOSPITAL | Age: 34
LOS: 1 days | Discharge: HOME | End: 2024-03-12
Payer: COMMERCIAL

## 2024-03-12 VITALS
SYSTOLIC BLOOD PRESSURE: 126 MMHG | RESPIRATION RATE: 16 BRPM | TEMPERATURE: 97 F | DIASTOLIC BLOOD PRESSURE: 71 MMHG | OXYGEN SATURATION: 98 % | HEART RATE: 82 BPM

## 2024-03-12 VITALS
WEIGHT: 200.4 LBS | SYSTOLIC BLOOD PRESSURE: 126 MMHG | HEIGHT: 69 IN | RESPIRATION RATE: 16 BRPM | OXYGEN SATURATION: 99 % | DIASTOLIC BLOOD PRESSURE: 76 MMHG | HEART RATE: 74 BPM | TEMPERATURE: 97 F

## 2024-03-12 DIAGNOSIS — Z98.890 OTHER SPECIFIED POSTPROCEDURAL STATES: Chronic | ICD-10-CM

## 2024-03-12 PROCEDURE — 88300 SURGICAL PATH GROSS: CPT | Mod: 26

## 2024-03-12 RX ORDER — ACETAMINOPHEN 500 MG
2 TABLET ORAL
Refills: 0 | DISCHARGE

## 2024-03-12 RX ORDER — SODIUM CHLORIDE 9 MG/ML
500 INJECTION, SOLUTION INTRAVENOUS
Refills: 0 | Status: DISCONTINUED | OUTPATIENT
Start: 2024-03-12 | End: 2024-03-12

## 2024-03-12 RX ORDER — OXYCODONE HYDROCHLORIDE 5 MG/1
5 TABLET ORAL ONCE
Refills: 0 | Status: DISCONTINUED | OUTPATIENT
Start: 2024-03-12 | End: 2024-03-12

## 2024-03-12 RX ORDER — APREPITANT 80 MG/1
40 CAPSULE ORAL ONCE
Refills: 0 | Status: COMPLETED | OUTPATIENT
Start: 2024-03-12 | End: 2024-03-12

## 2024-03-12 RX ORDER — FENTANYL CITRATE 50 UG/ML
25 INJECTION INTRAVENOUS
Refills: 0 | Status: DISCONTINUED | OUTPATIENT
Start: 2024-03-12 | End: 2024-03-12

## 2024-03-12 RX ORDER — ONDANSETRON 8 MG/1
4 TABLET, FILM COATED ORAL ONCE
Refills: 0 | Status: DISCONTINUED | OUTPATIENT
Start: 2024-03-12 | End: 2024-03-12

## 2024-03-12 RX ORDER — DIPHENHYDRAMINE HCL 50 MG
12.5 CAPSULE ORAL ONCE
Refills: 0 | Status: DISCONTINUED | OUTPATIENT
Start: 2024-03-12 | End: 2024-03-12

## 2024-03-12 RX ORDER — CELECOXIB 200 MG/1
200 CAPSULE ORAL ONCE
Refills: 0 | Status: DISCONTINUED | OUTPATIENT
Start: 2024-03-12 | End: 2024-03-12

## 2024-03-12 RX ORDER — ACETAMINOPHEN 500 MG
1000 TABLET ORAL ONCE
Refills: 0 | Status: COMPLETED | OUTPATIENT
Start: 2024-03-12 | End: 2024-03-12

## 2024-03-12 RX ADMIN — Medication 1000 MILLIGRAM(S): at 09:00

## 2024-03-12 RX ADMIN — APREPITANT 40 MILLIGRAM(S): 80 CAPSULE ORAL at 09:00

## 2024-03-12 NOTE — ASU DISCHARGE PLAN (ADULT/PEDIATRIC) - NS MD DC FALL RISK RISK
For information on Fall & Injury Prevention, visit: [email or web address] OR  [email or web address] OR  https://www.cdc.gov/steadi/patient.html

## 2024-03-12 NOTE — BRIEF OPERATIVE NOTE - COMMENTS
Severe caudal septal deviation RIGHT, bl deviation central bony/cartilaginous septum severe; inferior turbinate hypertrophy b/l

## 2024-03-12 NOTE — BRIEF OPERATIVE NOTE - NSICDXBRIEFPROCEDURE_GEN_ALL_CORE_FT
PROCEDURES:  Septoplasty, nose 12-Mar-2024 11:25:08  Maycol Sanders  Resection, submucosal, inferior turbinate 12-Mar-2024 11:25:15  Maycol Sanders

## 2024-03-12 NOTE — BRIEF OPERATIVE NOTE - OPERATION/FINDINGS
Severe caudal right septal deviation; bl deviation and inferior turbinate hypertrophy; manuel splints placed

## 2024-03-26 LAB — SURGICAL PATHOLOGY STUDY: SIGNIFICANT CHANGE UP

## (undated) DEVICE — BLADE MEDTRONIC ENT INFERIOR TURBINATE ROTATABLE STRAIGHT 2MM X11CM

## (undated) DEVICE — DRSG TEGADERM 2.5X3"

## (undated) DEVICE — SOL ANTI FOG

## (undated) DEVICE — SUT PROLENE 3-0 18" PS-2

## (undated) DEVICE — DRSG 2X2

## (undated) DEVICE — DRAPE MAYO STAND 23"

## (undated) DEVICE — VENODYNE/SCD SLEEVE CALF MEDIUM

## (undated) DEVICE — Device

## (undated) DEVICE — ELCTR BOVIE PENCIL BLADE 10FT

## (undated) DEVICE — SUT PDS II 5-0 27" RB-1

## (undated) DEVICE — WARMING BLANKET FULL ADULT

## (undated) DEVICE — SUT PLAIN GUT 4-0 18" SC-1

## (undated) DEVICE — HNDPC CELERIS BIPOLAR 2MM 5/BX

## (undated) DEVICE — GLV 7.5 PROTEXIS (WHITE)

## (undated) DEVICE — PACK RHINOPLASTY

## (undated) DEVICE — SUT CHROMIC 6-0 18" S-14

## (undated) DEVICE — DRSG AQUAPLAST BLANK BLUSH 3 X 3"

## (undated) DEVICE — SUT PDS II 4-0 18" P-3

## (undated) DEVICE — APPLICATOR COTTON TIP 6"

## (undated) DEVICE — SUT CHROMIC 4-0 18" G-3

## (undated) DEVICE — PREP BETADINE SPONGE STICKS

## (undated) DEVICE — ELCTR COLORADO 3CM

## (undated) DEVICE — SUT CHROMIC 4-0 27" RB-1

## (undated) DEVICE — TUBING SUCTION NONCONDUCTIVE 6MM X 12FT

## (undated) DEVICE — SUT PLAIN GUT FAST ABSORBING 5-0 PC-1

## (undated) DEVICE — MARKING PEN W RULER

## (undated) DEVICE — SUT PROLENE 6-0 18" RB-2

## (undated) DEVICE — PETRI DISH MED 3.5"

## (undated) DEVICE — SUT PROLENE 6-0 18" P-1